# Patient Record
Sex: FEMALE | Race: WHITE | NOT HISPANIC OR LATINO | URBAN - METROPOLITAN AREA
[De-identification: names, ages, dates, MRNs, and addresses within clinical notes are randomized per-mention and may not be internally consistent; named-entity substitution may affect disease eponyms.]

---

## 2017-04-25 ENCOUNTER — COMMUNICATION - HEALTHEAST (OUTPATIENT)
Dept: FAMILY MEDICINE | Facility: CLINIC | Age: 52
End: 2017-04-25

## 2017-12-27 ENCOUNTER — OFFICE VISIT - HEALTHEAST (OUTPATIENT)
Dept: FAMILY MEDICINE | Facility: CLINIC | Age: 52
End: 2017-12-27

## 2017-12-27 DIAGNOSIS — J01.90 ACUTE SINUSITIS: ICD-10-CM

## 2018-01-11 ENCOUNTER — OFFICE VISIT - HEALTHEAST (OUTPATIENT)
Dept: FAMILY MEDICINE | Facility: CLINIC | Age: 53
End: 2018-01-11

## 2018-01-11 ENCOUNTER — RECORDS - HEALTHEAST (OUTPATIENT)
Dept: GENERAL RADIOLOGY | Facility: CLINIC | Age: 53
End: 2018-01-11

## 2018-01-11 DIAGNOSIS — R05.9 COUGH: ICD-10-CM

## 2018-01-11 DIAGNOSIS — Z12.11 COLON CANCER SCREENING: ICD-10-CM

## 2018-01-11 DIAGNOSIS — Z12.31 VISIT FOR SCREENING MAMMOGRAM: ICD-10-CM

## 2018-01-11 DIAGNOSIS — J31.0 RHINITIS: ICD-10-CM

## 2018-01-11 DIAGNOSIS — F33.9 MAJOR DEPRESSIVE DISORDER, RECURRENT EPISODE (H): ICD-10-CM

## 2018-01-11 DIAGNOSIS — E03.9 HYPOTHYROIDISM: ICD-10-CM

## 2018-01-11 RX ORDER — LEVOTHYROXINE SODIUM 200 UG/1
200 TABLET ORAL DAILY
Qty: 90 TABLET | Refills: 0 | Status: SHIPPED | OUTPATIENT
Start: 2018-01-11

## 2018-01-11 RX ORDER — ALBUTEROL SULFATE 90 UG/1
2 AEROSOL, METERED RESPIRATORY (INHALATION) EVERY 4 HOURS PRN
Qty: 1 INHALER | Refills: 0 | Status: SHIPPED | OUTPATIENT
Start: 2018-01-11

## 2018-02-20 ENCOUNTER — OFFICE VISIT - HEALTHEAST (OUTPATIENT)
Dept: FAMILY MEDICINE | Facility: CLINIC | Age: 53
End: 2018-02-20

## 2018-02-20 DIAGNOSIS — Z12.31 VISIT FOR SCREENING MAMMOGRAM: ICD-10-CM

## 2018-02-20 DIAGNOSIS — R21 RASH: ICD-10-CM

## 2018-02-20 DIAGNOSIS — F41.1 GENERALIZED ANXIETY DISORDER: ICD-10-CM

## 2018-02-20 DIAGNOSIS — F33.9 MAJOR DEPRESSIVE DISORDER, RECURRENT EPISODE (H): ICD-10-CM

## 2018-02-20 DIAGNOSIS — G43.909 MIGRAINE: ICD-10-CM

## 2018-02-20 DIAGNOSIS — Z13.1 DIABETES MELLITUS SCREENING: ICD-10-CM

## 2018-02-20 DIAGNOSIS — R05.9 COUGH: ICD-10-CM

## 2018-02-20 DIAGNOSIS — Z12.11 COLON CANCER SCREENING: ICD-10-CM

## 2018-02-20 DIAGNOSIS — E03.9 HYPOTHYROIDISM: ICD-10-CM

## 2018-02-20 DIAGNOSIS — R04.0 BLOODY NOSE: ICD-10-CM

## 2018-02-20 LAB
HBA1C MFR BLD: 5.8 % (ref 3.5–6.1)
T4 FREE SERPL-MCNC: 1.6 NG/DL (ref 0.7–1.8)
TSH SERPL DL<=0.005 MIU/L-ACNC: 0.06 UIU/ML (ref 0.3–5)

## 2018-02-20 RX ORDER — SUMATRIPTAN 25 MG/1
25 TABLET, FILM COATED ORAL
Qty: 10 TABLET | Refills: 6 | Status: SHIPPED | OUTPATIENT
Start: 2018-02-20

## 2018-02-20 RX ORDER — VENLAFAXINE HYDROCHLORIDE 37.5 MG/1
37.5 CAPSULE, EXTENDED RELEASE ORAL DAILY
Qty: 90 CAPSULE | Refills: 0 | Status: SHIPPED | OUTPATIENT
Start: 2018-02-20

## 2018-02-20 RX ORDER — IBUPROFEN 800 MG/1
800 TABLET, FILM COATED ORAL 3 TIMES DAILY PRN
Qty: 30 TABLET | Refills: 6 | Status: SHIPPED | OUTPATIENT
Start: 2018-02-20

## 2018-02-20 RX ORDER — CLOTRIMAZOLE AND BETAMETHASONE DIPROPIONATE 10; .64 MG/G; MG/G
CREAM TOPICAL
Qty: 30 G | Refills: 0 | Status: SHIPPED | OUTPATIENT
Start: 2018-02-20

## 2018-03-10 ENCOUNTER — RECORDS - HEALTHEAST (OUTPATIENT)
Dept: ADMINISTRATIVE | Facility: OTHER | Age: 53
End: 2018-03-10

## 2018-03-12 ENCOUNTER — OFFICE VISIT - HEALTHEAST (OUTPATIENT)
Dept: FAMILY MEDICINE | Facility: CLINIC | Age: 53
End: 2018-03-12

## 2018-03-12 DIAGNOSIS — R05.9 COUGH: ICD-10-CM

## 2018-04-03 ENCOUNTER — OFFICE VISIT - HEALTHEAST (OUTPATIENT)
Dept: FAMILY MEDICINE | Facility: CLINIC | Age: 53
End: 2018-04-03

## 2018-04-03 DIAGNOSIS — E03.9 HYPOTHYROIDISM: ICD-10-CM

## 2018-04-03 DIAGNOSIS — J32.9 SINUSITIS: ICD-10-CM

## 2018-04-03 DIAGNOSIS — Z78.0 MENOPAUSE: ICD-10-CM

## 2018-04-03 DIAGNOSIS — R73.03 PREDIABETES: ICD-10-CM

## 2018-04-03 DIAGNOSIS — L30.9 DERMATITIS: ICD-10-CM

## 2018-04-03 LAB
T4 FREE SERPL-MCNC: 1.4 NG/DL (ref 0.7–1.8)
TSH SERPL DL<=0.005 MIU/L-ACNC: 0.02 UIU/ML (ref 0.3–5)

## 2018-07-24 ENCOUNTER — COMMUNICATION - HEALTHEAST (OUTPATIENT)
Dept: ADMINISTRATIVE | Facility: CLINIC | Age: 53
End: 2018-07-24

## 2018-07-30 ENCOUNTER — COMMUNICATION - HEALTHEAST (OUTPATIENT)
Dept: FAMILY MEDICINE | Facility: CLINIC | Age: 53
End: 2018-07-30

## 2021-05-27 ENCOUNTER — RECORDS - HEALTHEAST (OUTPATIENT)
Dept: ADMINISTRATIVE | Facility: CLINIC | Age: 56
End: 2021-05-27

## 2021-05-31 VITALS — BODY MASS INDEX: 40.51 KG/M2 | WEIGHT: 251 LBS

## 2021-05-31 VITALS — BODY MASS INDEX: 40.19 KG/M2 | WEIGHT: 249 LBS

## 2021-06-01 VITALS — BODY MASS INDEX: 39.53 KG/M2 | WEIGHT: 244.9 LBS

## 2021-06-01 VITALS — BODY MASS INDEX: 40.35 KG/M2 | WEIGHT: 250 LBS

## 2021-06-01 VITALS — BODY MASS INDEX: 41.03 KG/M2 | WEIGHT: 254.19 LBS

## 2021-06-02 ENCOUNTER — RECORDS - HEALTHEAST (OUTPATIENT)
Dept: ADMINISTRATIVE | Facility: CLINIC | Age: 56
End: 2021-06-02

## 2021-06-15 NOTE — PROGRESS NOTES
Provider wore a mask during this visit.     Subjective:   Ailyn Hwang is a 52 y.o. female  Roomed by: Dulce S    Accompanied by Daughter    Refills needed? No    Do you have any forms that need to be filled out? No      Chief Complaint   Patient presents with     Sinus Problem     Poss sinus infection    Illness started last week of November with cough, sore throat, sinus congestion. Says cough has continued and sinus pressure is worse. Denies any CP, but admits some SOB. Denies fever, but admits chills in the last 2 days. Denies nausea, vomiting, or belly pain, but admits diarrhea. Admits headache, but not dizziness. Has been drinking and urinating normally. Appetite has been down. Admits fatigue.     Review of Systems  Const - Resp - see HPI  Allergies   Allergen Reactions     Amoxicillin Hives and Nausea Only     Codeine      Nyquil Swelling       Current Outpatient Prescriptions:      acetic acid-hydrocortisone (VOSOL-HC) otic solution, Administer 2 drops into ears 3 (three) times a day., Disp: 10 mL, Rfl: 1     albuterol (PROVENTIL HFA;VENTOLIN HFA) 90 mcg/actuation inhaler, Inhale 2 puffs every 4 (four) hours as needed., Disp: , Rfl:      aspirin 81 mg chewable tablet, , Disp: , Rfl:      buPROPion (WELLBUTRIN) 100 MG tablet, Take 1 tablet (100 mg total) by mouth 2 (two) times a day., Disp: 60 tablet, Rfl: 1     cetirizine (ZYRTEC) 10 MG tablet, Take 10 mg by mouth daily., Disp: , Rfl:      chromium 200 mcg Tab, Take by mouth., Disp: , Rfl:      docusate sodium (COLACE) 100 MG capsule, Take 100 mg by mouth daily., Disp: , Rfl:      fluticasone (FLONASE) 50 mcg/actuation nasal spray, 2 sprays into each nostril daily., Disp: 16 g, Rfl: 2     hydrochlorothiazide (HYDRODIURIL) 25 MG tablet, Take 25 mg by mouth daily., Disp: , Rfl:      ibuprofen (ADVIL,MOTRIN) 800 MG tablet, Take 1 tablet (800 mg total) by mouth 3 (three) times a day as needed., Disp: 30 tablet, Rfl: 0     levothyroxine (SYNTHROID) 200 MCG  tablet, Take 1 tablet (200 mcg total) by mouth daily. In addition to 88 mcg tablet daily, Disp: 30 tablet, Rfl: 0     MULTIVITAMIN (MULTIPLE VITAMIN ORAL), , Disp: , Rfl:      OMEGA-3S/DHA/EPA/FISH OIL (OMEGA 3 ORAL), Take by mouth., Disp: , Rfl:      omeprazole (PRILOSEC) 20 MG capsule, Take one capsule by mouth twice daily, Disp: 60 capsule, Rfl: 3     ondansetron (ZOFRAN) 4 MG tablet, 1 tablet., Disp: , Rfl:      SUMAtriptan (IMITREX) 25 MG tablet, Take 1 tablet (25 mg total) by mouth every 2 (two) hours as needed for migraine. Maximum of 8 tablets daily, Disp: 10 tablet, Rfl: 1     UNABLE TO FIND, Med Name:5HTP, Disp: , Rfl:      venlafaxine (EFFEXOR-XR) 37.5 MG 24 hr capsule, Take 1 capsule (37.5 mg total) by mouth daily., Disp: 90 capsule, Rfl: 0  Patient Active Problem List   Diagnosis     Fluid Overload     Insomnia     Obesity     Allergies     Attention-deficit Hyperactivity Disorder     Joint Pain, Localized In The Knee     Hypothyroidism     Major Depression, Recurrent     Generalized Anxiety Disorder     Esophageal Reflux     Headache     Obstructive Sleep Apnea     Bronchitis     Chronic Sinusitis     Prediabetes     Eczema     Vitamin D deficiency     H/O: hysterectomy     Medical History Reviewed  Objective:     Vitals:    12/27/17 1920   BP: 120/64   Patient Site: Right Arm   Patient Position: Sitting   Cuff Size: Adult Large   Pulse: 98   Resp: 18   Temp: 98.4  F (36.9  C)   TempSrc: Oral   SpO2: 99%   Weight: (!) 251 lb (113.9 kg)   Gen - Pt in NAD  Eyes - Conjunctiva non injected, no drainage  Face - mildly TTP over frontal sinus areas; mildly TTP over maxillary area  Ears - external canals - no induration, Right TM - not injected, Left TM - not injected   Nose - mildly congested, no nasal drainage  Pharynx - non injected, tonsils 1+ size  Neck - supple, no cervical adenopathy, no masses  Cor - RRR w/o murmur  Lungs - Good air entry; no wheezes or crackles noted on auscultation - sporadic  coughing noted  Skin - no lesions, no rashes noted    Assessment - Plan   Medical Decision Making -52-year-old woman presents with almost a month of URI symptoms with nasal congestion sinus pressure and cough.  Because of the duration of patient's symptoms will treat this as an acute sinusitis.     1. Acute sinusitis  - doxycycline (ADOXA) 100 MG tablet; Take 1 tablet (100 mg total) by mouth 2 (two) times a day for 7 days.  Dispense: 14 tablet; Refill: 0    At the conclusion of the encounter, assessment and plan were discussed.   All questions were answered.   The patient or guardian acknowledged understanding and was involved in the decision making regarding the overall care plan.    Patient Instructions     1. Keep well hydrated  2.  May alternate Tylenol every 6 hours with ibuprofen every 6 hours as needed for fever or pain  3. If symptoms not improved after completing antibiotics, follow up with primary  4. If you have any questions, call the clinic number - it's answered 24/7      Acute Bacterial Rhinosinusitis (ABRS)  Acute bacterial rhinosinusitis (ABRS) is an infection of your nasal cavity and sinuses. It s caused by bacteria. Acute means that you ve had symptoms for less than 12 weeks.  Understanding your sinuses  The nasal cavity is the large air-filled space behind your nose. The sinuses are a group of spaces formed by the bones of your face. They connect with your nasal cavity. ABRS causes the tissue lining these spaces to become inflamed. Mucus may not drain normally. This leads to facial pain and other symptoms.  What causes ABRS?  ABRS most often follows an upper respiratory infection caused by a virus. Bacteria then infect the lining of your nasal cavity and sinuses. But you can also get ABRS if you have:    Nasal allergies    Long-term nasal swelling and congestion not caused by allergies    Blockage in the nose  Symptoms of ABRS  The symptoms of ABRS may be different for each person, and can  include:    Nasal congestion    Runny nose    Fluid draining from the nose down the throat (postnasal drip)    Headache    Cough    Pain in the sinuses    Thick, colored fluid from the nose (mucus)    Fever  Diagnosing ABRS  ABRS may be diagnosed if you ve had an upper respiratory infection like a cold and cough for longer than 10 to 14 days. Your health care provider will ask about your symptoms and your medical history. The provider will check your vital signs, including your temperature. You ll have a physical exam. The health care provider will check your ears, nose, and throat. You likely won t need any tests. If ABRS comes back, you may have a culture or other tests.  Treatment for ABRS  Treatment may include:    Antibiotic medicine. This is for symptoms that last for at least 10 to 14 days.    Nasal corticosteroid medicine. Drops or spray used in the nose can lessen swelling and congestion.    Over-the-counter pain medicine. This is to lessen sinus pain and pressure.    Nasal decongestant medicine. Spray or drops may help to lessen congestion. Do not use them for more than a few days.    Salt wash (saline irrigation). This can help to loosen mucus.  Possible complications of ABRS  ABRS may come back or become long-term (chronic).  In rare cases, ABRS may cause complications such as:     Inflamed tissue around the brain and spinal cord (meningitis)    Inflamed tissue around the eyes (orbital cellulitis)    Inflamed bones around the sinuses (osteitis)  These problems may need to be treated in a hospital with intravenous (IV) antibiotic medicine or surgery.  When to call the health care provider  Call your health care provider if you have any of the following:    Symptoms that don t get better, or get worse    Symptoms that don t get better after 3 to 5 days on antibiotics    Trouble seeing    Swelling around your eyes    Confusion or trouble staying awake   Date Last Reviewed: 3/3/2015    [id number] [facility]  [employer]. [address] [place] [address], [place] [address]. All rights reserved. This information is not intended as a substitute for professional medical care. Always follow your healthcare professional's instructions.

## 2021-06-15 NOTE — PROGRESS NOTES
ASSESSMENT/PLAN:    Cough  Pleasant 52 y.o.  female presents with cough following completion of 2 rounds of antibiotics (azithromycin & doxycycline).  CXR today was unremarkable.  I suspect she may have post-infectious cough.  I will give her tessalon perles & prednisone.  Will also refill albuterol.  The patient may continue with OTC symptomatic treatment.  Rest, hydration, nutritional support, and time were counseled.  Follow up if the patient is not better in 1-2 weeks.  The patient verbalized understanding and agreed with the plan.      -     XR Chest 2 Views; Future; Expected date: 1/11/18  -     albuterol (PROAIR HFA;PROVENTIL HFA;VENTOLIN HFA) 90 mcg/actuation inhaler; Inhale 2 puffs every 4 (four) hours as needed.  Dispense: 1 Inhaler; Refill: 0  -     benzonatate (TESSALON PERLES) 100 MG capsule; Take 1 capsule (100 mg total) by mouth every 6 (six) hours as needed for cough.  Dispense: 30 capsule; Refill: 0  -     predniSONE (DELTASONE) 20 MG tablet; 60mg x 3 d, 40mg x 3d, 20mg x 3d  Dispense: 18 tablet; Refill: 0    Colon cancer screening  -     Ambulatory referral for Colonoscopy    Visit for screening mammogram  -     Mammo Screening Bilateral; Future; Expected date: 1/11/18      Orders Placed This Encounter   Procedures     XR Chest 2 Views     Standing Status:   Future     Number of Occurrences:   1     Standing Expiration Date:   1/11/2019     Order Specific Question:   Is the patient pregnant?     Answer:   No     Order Specific Question:   Can the procedure be changed per Radiologist protocol?     Answer:   Yes     Mammo Screening Bilateral     Standing Status:   Future     Standing Expiration Date:   4/12/2019     Order Specific Question:   Is the patient pregnant?     Answer:   No     Order Specific Question:   Can the procedure be changed per Radiologist protocol?     Answer:   Yes     Ambulatory referral for Colonoscopy     Referral Priority:   Routine     Referral Type:   Colonoscopy      Requested Specialty:   Gastroenterology     Number of Visits Requested:   1           CHIEF COMPLAINT:  Chief Complaint   Patient presents with     Rash     all over body and bidy itchy     Cough     x months        HISTORY OF PRESENT ILLNESS:  Ailyn is a 52 y.o. female presenting to the clinic today for a rash. Here with her  today. She started to have a rash a couple of days ago. It is located on the chest and arms. She feels very itchy. Her last dose of doxycycline for sinusitis was 4 days ago.     Cough: she has had a cough for the past 3 months. She first had z-scotty for her symptoms, and felt no improvement. She then went to walk in clinic on 12/27/2017, and was given doxycycline for probable sinusitis. Her sinuses feel improved. She continues to have a deep cough. The cough can be dry to productive. She has been using the albuterol for shortness of breath and wheezing. She has felt run down with chills and feeling feverish. She has not had a chest XR in the past 3 months.    Health Maintenance: She is due for a mammogram and colonoscopy.      REVIEW OF SYSTEMS:   Eyes: Negative.   Cardiovascular: Negative.   Gastrointestinal: Negative.   Endocrine: Negative.   Genitourinary: Negative.   Musculoskeletal: Negative.   Allergic/Immunologic: Negative.   Neurological: Negative.   Hematological: Negative.   Psychiatric/Behavioral: Negative.   All other systems are negative.    PFSH:  No new history.     TOBACCO USE:  History   Smoking Status     Former Smoker     Quit date: 2/11/2002   Smokeless Tobacco     Never Used       VITALS:  Vitals:    01/11/18 1156   BP: 136/88   Pulse: 76   Temp: 98.3  F (36.8  C)   TempSrc: Oral   SpO2: 97%   Weight: (!) 249 lb (112.9 kg)     Wt Readings from Last 3 Encounters:   01/11/18 (!) 249 lb (112.9 kg)   12/27/17 (!) 251 lb (113.9 kg)   02/11/16 (!) 251 lb 11.2 oz (114.2 kg)       PHYSICAL EXAM:  Constitutional: Patient is oriented to person, place, and time. Patient appears  well-developed and well-nourished. No distress.   Head: Normocephalic and atraumatic.   Right Ear: External ear normal. Ear canal and TM normal.   Left Ear: External ear normal. Ear canal and TM normal.   Nose: Nose normal.   Mouth/Throat: Oropharynx is clear and moist. No oropharyngeal exudate.   Eyes: Conjunctivae and EOM are normal. Pupils are equal, round, and reactive to light. Right eye exhibits no discharge. Left eye exhibits no discharge. No scleral icterus.   Cardiovascular: Normal rate, regular rhythm, normal heart sounds and intact distal pulses. No murmur heard.   Pulmonary/Chest: Effort normal and breath sounds normal. No stridor. No respiratory distress. Patient has no wheezes, no rales, exhibits no tenderness.     Results for orders placed or performed in visit on 02/11/16   Thyroid Stimulating Hormone (TSH)   Result Value Ref Range    TSH 5.90 (H) 0.30 - 5.00 uIU/mL   Lipid Cascade   Result Value Ref Range    Cholesterol 245 (H) <=199 mg/dL    Triglycerides 150 (H) <=149 mg/dL    HDL Cholesterol 44 (L) >=50 mg/dL    LDL Calculated 171 (H) <=129 mg/dL    Patient Fasting > 8hrs? Yes    Glucose   Result Value Ref Range    Glucose 89 70 - 99 mg/dL    Patient Fasting > 8hrs? Yes            ADDITIONAL HISTORY SUMMARIZED (2): Reviewed note from [facility] regarding sinusitis and cough.  DECISION TO OBTAIN EXTRA INFORMATION (1): None.   RADIOLOGY TESTS (1): Ordered chest XR.  LABS (1): None.  MEDICINE TESTS (1): None.  INDEPENDENT REVIEW (2 each): None.     I, Lalita Strauss, am scribing for and in the presence of, Dr. Hudson.    IGera MD , personally performed the services described in this documentation, as scribed by Lalita Strauss in my presence, and it is both accurate and complete.    MEDICATIONS:  Current Outpatient Prescriptions   Medication Sig Dispense Refill     acetic acid-hydrocortisone (VOSOL-HC) otic solution Administer 2 drops into ears 3 (three) times a  day. 10 mL 1     albuterol (PROAIR HFA;PROVENTIL HFA;VENTOLIN HFA) 90 mcg/actuation inhaler Inhale 2 puffs every 4 (four) hours as needed. 1 Inhaler 0     aspirin 81 mg chewable tablet        cetirizine (ZYRTEC) 10 MG tablet Take 10 mg by mouth daily.       fluticasone (FLONASE) 50 mcg/actuation nasal spray 2 sprays into each nostril daily. 16 g 2     hydrochlorothiazide (HYDRODIURIL) 25 MG tablet Take 25 mg by mouth daily.       levothyroxine (SYNTHROID) 200 MCG tablet Take 1 tablet (200 mcg total) by mouth daily. In addition to 88 mcg tablet daily 30 tablet 0     omeprazole (PRILOSEC) 20 MG capsule Take one capsule by mouth twice daily 60 capsule 3     ondansetron (ZOFRAN) 4 MG tablet 1 tablet.       SUMAtriptan (IMITREX) 25 MG tablet Take 1 tablet (25 mg total) by mouth every 2 (two) hours as needed for migraine. Maximum of 8 tablets daily 10 tablet 1     UNABLE TO FIND Med Name:5HTP       venlafaxine (EFFEXOR-XR) 37.5 MG 24 hr capsule Take 1 capsule (37.5 mg total) by mouth daily. 90 capsule 0     benzonatate (TESSALON PERLES) 100 MG capsule Take 1 capsule (100 mg total) by mouth every 6 (six) hours as needed for cough. 30 capsule 0     buPROPion (WELLBUTRIN) 100 MG tablet Take 1 tablet (100 mg total) by mouth 2 (two) times a day. 60 tablet 1     chromium 200 mcg Tab Take by mouth.       docusate sodium (COLACE) 100 MG capsule Take 100 mg by mouth daily.       ibuprofen (ADVIL,MOTRIN) 800 MG tablet Take 1 tablet (800 mg total) by mouth 3 (three) times a day as needed. 30 tablet 0     MULTIVITAMIN (MULTIPLE VITAMIN ORAL)        OMEGA-3S/DHA/EPA/FISH OIL (OMEGA 3 ORAL) Take by mouth.       predniSONE (DELTASONE) 20 MG tablet 60mg x 3 d, 40mg x 3d, 20mg x 3d 18 tablet 0     No current facility-administered medications for this visit.        Total data points: 3

## 2021-06-16 PROBLEM — G43.909 MIGRAINE: Status: ACTIVE | Noted: 2018-02-20

## 2021-06-16 NOTE — PROGRESS NOTES
ASSESSMENT/PLAN:  Rash  Suspect contact dermatitis  Will give her lotrisone  If not better, consider dermatology  -     JIC LAV  -     clotrimazole-betamethasone (LOTRISONE) cream; Apply to affected skin three times daily  Dispense: 30 g; Refill: 0    Hypothyroidism  Levothyroxine 200mcg  -     Thyroid Stimulating Hormone (TSH)  -     T4, Free    Migraine  refilled  -     ibuprofen (ADVIL,MOTRIN) 800 MG tablet; Take 1 tablet (800 mg total) by mouth 3 (three) times a day as needed.  Dispense: 30 tablet; Refill: 6  -     SUMAtriptan (IMITREX) 25 MG tablet; Take 1 tablet (25 mg total) by mouth every 2 (two) hours as needed for migraine. Maximum of 8 tablets daily  Dispense: 10 tablet; Refill: 6    Bloody nose  Stop ASA    Generalized anxiety disorder, depression  refilled  -     venlafaxine (EFFEXOR-XR) 37.5 MG 24 hr capsule; Take 1 capsule (37.5 mg total) by mouth daily.  Dispense: 90 capsule; Refill: 0    Colon cancer screening  -     Ambulatory referral for Colonoscopy    Visit for screening mammogram  -     Mammo Screening Bilateral; Future; Expected date: 2/20/18    Cough  Will refer to allergy for f/o asthma  ACT=20  -     Ambulatory referral to Allergy    Diabetes mellitus screening  -     Glycosylated Hemoglobin A1c      Orders Placed This Encounter   Procedures     Mammo Screening Bilateral     Standing Status:   Future     Standing Expiration Date:   5/22/2019     Order Specific Question:   Is the patient pregnant?     Answer:   No     Order Specific Question:   Can the procedure be changed per Radiologist protocol?     Answer:   Yes     Thyroid Stimulating Hormone (TSH)     T4, Free     JIC LAV     Glycosylated Hemoglobin A1c     Ambulatory referral for Colonoscopy     Referral Priority:   Routine     Referral Type:   Colonoscopy     Requested Specialty:   Gastroenterology     Number of Visits Requested:   1     Ambulatory referral to Allergy     Referral Priority:   Routine     Referral Type:   Allergy,  Asthma, and Immunology     Referral Reason:   Evaluation and Treatment     Requested Specialty:   Allergy     Number of Visits Requested:   1           CHIEF COMPLAINT:  Chief Complaint   Patient presents with     Rash     mostly both legs, itches all over     med check     discuss med dose for  Effexor     Medication Refill       HISTORY OF PRESENT ILLNESS:  Ailyn is a 52 y.o. female presenting to the clinic today for a rash. Here with her  today. She has had a rash on her lower legs, back and chest for the past month or so. It is very itchy for her. No new lotions, soaps, detergents, medications. No recent travel. The rash is most pronounced on her left lower leg. No one at home has similar symptoms. She has been oil, and a topical that she does not remember the name of. She has tried some OTC topical for eczema and oatmeal baths, and this has not helped the itchiness. Denies sore throat, fevers, chills. She has been keeping the areas of rash moisturized. She has had appearance of more spots since she first noticed them.     Cough: She had onset of cough at the end of November 2017. She still has her barky cough, but the frequency of cough is much less. She will use albuterol for her couhg when she is feeling short of breath. There has been times when the cough feels better, and times when she feels like it is worse. She says that she has not felt her baseline since first onset of her cough and cold months ago.     Depression/Anxiety: She is currently taking venlafaxine 37.5mg daily. This was a recent switch from Wellbutrin. She feels like this is helping her somewhat. She feels like it is helping more with some of her menopause symptoms.     Hypothyroidism: She is currently taking levothyroxine 200mcg. She takes this daily, and has been consistent with this. Due for a thyroid check today. She does note that she has had some chest palpitations.     Epistaxis: She has had 3 bloody noses in the past few weeks.  These have last about 30 minutes for her. She is taking a daily baby aspirin.     Health Maintenance: She is due for a colonoscopy. She is due for a mammogram.     REVIEW OF SYSTEMS:   Constitutional: Negative.   HENT: Negative.   Eyes: Negative.   Respiratory: Negative.   Cardiovascular: Negative.   Gastrointestinal: Negative.   Endocrine: Negative.   Genitourinary: Negative.   Musculoskeletal: Negative.   Allergic/Immunologic: Negative.   Neurological: Negative.   Hematological: Negative.   Psychiatric/Behavioral: Negative.   All other systems are negative.    PFSH:  No new history.     TOBACCO USE:  History   Smoking Status     Former Smoker     Quit date: 2/11/2002   Smokeless Tobacco     Never Used       VITALS:  Vitals:    02/20/18 1450   BP: 108/66   Pulse: 68   Weight: (!) 250 lb (113.4 kg)     Wt Readings from Last 3 Encounters:   02/20/18 (!) 250 lb (113.4 kg)   01/11/18 (!) 249 lb (112.9 kg)   12/27/17 (!) 251 lb (113.9 kg)       PHYSICAL EXAM:  Constitutional: Patient is oriented to person, place, and time. Patient appears well-developed and well-nourished. No distress.   Head: Normocephalic and atraumatic.   Right Ear: External ear normal.   Left Ear: External ear normal.   Nose: Nose normal.   Eyes: Conjunctivae and EOM are normal. Right eye exhibits no discharge. Left eye exhibits no discharge. No scleral icterus.   Skin: Skin is warm and dry. Non specific irritation of both leg; left leg is worse than right.     Results for orders placed or performed in visit on 02/20/18   Glycosylated Hemoglobin A1c   Result Value Ref Range    Hemoglobin A1c 5.8 3.5 - 6.1 %           ADDITIONAL HISTORY SUMMARIZED (2): None.  DECISION TO OBTAIN EXTRA INFORMATION (1): None.   RADIOLOGY TESTS (1): Ordered mammogram today.  LABS (1): Ordered labs today.  MEDICINE TESTS (1): None.  INDEPENDENT REVIEW (2 each): None.     Lalita ALBERTS, am scribing for and in the presence of, Dr. Hudson.    Gera ALBERTS   Becca Norris MD , personally performed the services described in this documentation, as scribed by Lalita Strauss in my presence, and it is both accurate and complete.    MEDICATIONS:  Current Outpatient Prescriptions   Medication Sig Dispense Refill     albuterol (PROAIR HFA;PROVENTIL HFA;VENTOLIN HFA) 90 mcg/actuation inhaler Inhale 2 puffs every 4 (four) hours as needed. 1 Inhaler 0     cetirizine (ZYRTEC) 10 MG tablet Take 10 mg by mouth daily.       docusate sodium (COLACE) 100 MG capsule Take 100 mg by mouth daily.       fluticasone (FLONASE) 50 mcg/actuation nasal spray 2 sprays into each nostril daily. 16 g 2     hydrochlorothiazide (HYDRODIURIL) 25 MG tablet Take 25 mg by mouth daily as needed. For leg swelling       ibuprofen (ADVIL,MOTRIN) 800 MG tablet Take 1 tablet (800 mg total) by mouth 3 (three) times a day as needed. 30 tablet 6     levothyroxine (SYNTHROID) 200 MCG tablet Take 1 tablet (200 mcg total) by mouth daily. In addition to 88 mcg tablet daily 90 tablet 0     MULTIVITAMIN (MULTIPLE VITAMIN ORAL)        OMEGA-3S/DHA/EPA/FISH OIL (OMEGA 3 ORAL) Take by mouth.       omeprazole (PRILOSEC) 20 MG capsule Take one capsule by mouth twice daily 60 capsule 3     SUMAtriptan (IMITREX) 25 MG tablet Take 1 tablet (25 mg total) by mouth every 2 (two) hours as needed for migraine. Maximum of 8 tablets daily 10 tablet 6     venlafaxine (EFFEXOR-XR) 37.5 MG 24 hr capsule Take 1 capsule (37.5 mg total) by mouth daily. 90 capsule 0     clotrimazole-betamethasone (LOTRISONE) cream Apply to affected skin three times daily 30 g 0     UNABLE TO FIND Med Name:5HTP       No current facility-administered medications for this visit.        Total data points: 2

## 2021-06-16 NOTE — PROGRESS NOTES
Assessment/Plan:        Diagnoses and all orders for this visit:    Cough        Current episode likely viral.  Discussed condition in general as well as holding off on antibiotics.  We also discussed about her other medications, albuterol.  She then got upset and prefers to follow-up with PCP, Dr. Hudson.  Discussed concerns with antibiotic use when not indicated.  She left the room even before the conversation was done.  To visit with provider of choice if persistent or worse in the next couple of days as Dr. Hudson unavailable until 3/26/18.   Subjective:    Patient ID: Ailyn Hwang is a 52 y.o. female.    HPI    Ailyn is here with concerns about cough.  Was not feeling well 5 days ago.  3 days ago, started having colds, cough, fever with T-max of 101.7.  Notes sinus pressure, body aches, headache.  She has chest congestion and unable to mobilize secretion.  No hemoptysis.  She was not exposed to anyone ill that she is aware of.  No recent travel.  Quit smoking 25 years ago, less than a pack per day for around 10 years.  Tried TheraFlu, ibuprofen.  She does not tolerate Mucinex, upset stomach.  She has albuterol as well.  She has had sinus infections, cough in November.  Was referred to allergy last month.  Completed 2 courses of antibiotics, Z-Bertram, doxycycline.  Chest x-ray January 11, 2018 was negative.    Review of Systems  As above otherwise negative.          Objective:    Physical Exam  /60 (Patient Site: Left Arm, Patient Position: Sitting, Cuff Size: Adult Large)  Pulse 85  Temp 98.6  F (37  C) (Oral)   Wt (!) 244 lb 14.4 oz (111.1 kg)  SpO2 95%  Breastfeeding? No  BMI 39.53 kg/m2    Vital signs noted above. AAO ×3.  HEENT no nasal discharge, moist oral mucosa. Ears:TMs clear and intact, no fluid collection.  Very minimal erythema surrounding the border of the right TM.  Neck: Supple neck, nonpalpable cervical lymph nodes.  Lungs: Clear to auscultation bilateral.  Heart: S1-S2 regular rate and  rhythm, no murmurs were noted.  Abdomen:  with bowel sounds.  Extremities: pulses were full and equal.

## 2021-06-17 NOTE — PROGRESS NOTES
ASSESSMENT/PLAN:  Itchy rash, generalized and intermittent  Patient thinks it may be due to effexor, given the timing of the rash with starting medication  Stop effexor  Benadryl for the itch and rash  If still symptomatic 2 wks after stopping effexor, then recommend dermatology    Menopause symptoms (hot flash, night sweat, irritability, concentration) in the setting of Hypothyroidism  Taking levothyroxine 200mcg  TSH was slightly low last time.  Will check levels and adjust dose  Will also [facility] to [facility]  -     Thyroid Stimulating Hormone (TSH)  -     T4, Free    Sinusitis  -     azithromycin (ZITHROMAX Z-JAYRO) 250 MG tablet; Take 2 tablets (500 mg) on  Day 1,  followed by 1 tablet (250 mg) once daily on Days 2 through 5.  Dispense: 6 tablet; Refill: 0    Prediabetes  Watch out for dietary intake of carbohydrate  Counseled lifestyle modifications      Orders Placed This Encounter   Procedures     Thyroid Stimulating Hormone (TSH)     T4, Free     JIC LAV       CHIEF COMPLAINT:  Chief Complaint   Patient presents with     hives     facial pressure     Paperwork     Ear Pain     left ear     Anxiety       HISTORY OF PRESENT ILLNESS:  Ailyn is a 52 y.o. female presenting to the clinic today for a follow up a rash. Here with her  today. She has a rash that she believes are hives for the past 2 months. They are very itchy for her. She feels like it is related to the venlafaxine which is a new medication for her. She has not taken this in the past 2 days. She has been taking Benadryl for the rash. The rash and itchiness come and go for her. The rash is diffusely over her body now. Since stopping the venlafaxine, she is feeling foggy in the head, and feels her mood is more related to hormone fluctuations in menopause. She has felt more on edge and irritable, and continues to experience hot flashes, night sweats, difficulties concentrating, and sleeplessness.     Little interest or pleasure in doing  things: Not at all  Feeling down, depressed, or hopeless: Not at all  Trouble falling or staying asleep, or sleeping too much: Not at all  Feeling tired or having little energy: Not at all  Poor appetite or overeating: Not at all  Feeling bad about yourself - or that you are a failure or have let yourself or your family down: Not at all  Trouble concentrating on things, such as reading the newspaper or watching television: Several days  Moving or speaking so slowly that other people could have noticed. Or the opposite - being so fidgety or restless that you have been moving around a lot more than usual: Not at all  Thoughts that you would be better off dead, or of hurting yourself in some way: Not at all  PHQ-9 Total Score: 1  If you checked off any problems, how difficult have these problems made it for you to do your work, take care of things at home, or get along with other people?: Not difficult at all    Feeling nervous, anxious or on edge: 0  Not being able to stop or control worry: 0  Worrying too much about different things: 0  Trouble relaxin  Being so restless that is is hard to sit still: 1  Becoming easily annnoyed or irritable: 1  Feeling afraid as if something awful might happen: 0  JOHNNA-7 Total: 2  How difficult did these problems make it for you to do your work, take care of things at home or get along with other people? : Not difficult at all    Ear Pain: She has had ear pain and facial pressure for the past few weeks. She has discolored nasal discharged. She feels pain in the left ear, and some pressure in the right ear. She still has her cough. She has mild sore throat. Denies fevers or chills. She did see Dr. Petersen on 3/12/2018, and go to an urgent care, and was given a 7 day course of antibiotics by urgent care. She did take and finish the antibiotic, and had some improvement of her symptoms.     Additionally, she comes with paperwork to be completed.     REVIEW OF SYSTEMS:   Constitutional:  Negative.   Eyes: Negative.    Cardiovascular: Negative.   Gastrointestinal: Negative.   Endocrine: Negative.   Genitourinary: Negative.   Musculoskeletal: Negative.   Allergic/Immunologic: Negative.   Neurological: Negative.   Hematological: Negative.   Psychiatric/Behavioral: Negative.   All other systems are negative.    PFSH:  No new history.     TOBACCO USE:  History   Smoking Status     Former Smoker     Quit date: 2/11/2002   Smokeless Tobacco     Never Used       VITALS:  Vitals:    04/03/18 0750   BP: 118/60   Pulse: 80   Weight: (!) 254 lb 3 oz (115.3 kg)     Wt Readings from Last 3 Encounters:   04/03/18 (!) 254 lb 3 oz (115.3 kg)   03/12/18 (!) 244 lb 14.4 oz (111.1 kg)   02/20/18 (!) 250 lb (113.4 kg)       PHYSICAL EXAM:  Constitutional: Patient is oriented to person, place, and time. Patient appears well-developed and well-nourished. No distress.   Head: Normocephalic and atraumatic.   Right Ear: External ear normal. Ear canal and TM normal.   Left Ear: External ear normal. Ear canal and TM normal.   Nose: Nose normal.   Mouth/Throat: Oropharynx is clear and moist. No oropharyngeal exudate.   Eyes: Conjunctivae and EOM are normal. Pupils are equal, round, and reactive to light. Right eye exhibits no discharge. Left eye exhibits no discharge. No scleral icterus.   Cardiovascular: Normal rate, regular rhythm, normal heart sounds and intact distal pulses. No murmur heard.   Pulmonary/Chest: Effort normal and breath sounds normal. No stridor. No respiratory distress. Patient has no wheezes, no rales, exhibits no tenderness.    Skin: Skin is warm and dry. No rash noted. Patient is not diaphoretic. No erythema. No pallor.    Results for orders placed or performed in visit on 02/20/18   Thyroid Stimulating Hormone (TSH)   Result Value Ref Range    TSH 0.06 (L) 0.30 - 5.00 uIU/mL   T4, Free   Result Value Ref Range    Free T4 1.6 0.7 - 1.8 ng/dL   Glycosylated Hemoglobin A1c   Result Value Ref Range     Hemoglobin A1c 5.8 3.5 - 6.1 %           ADDITIONAL HISTORY SUMMARIZED (2): Reviewed and summarized note from Dr. Petersen from 3/12/2018 regarding cough.  DECISION TO OBTAIN EXTRA INFORMATION (1): None.   RADIOLOGY TESTS (1): None.  LABS (1): Reviewed and ordered labs today.  MEDICINE TESTS (1): None.  INDEPENDENT REVIEW (2 each): None.     I, Lalita Strauss, am scribing for and in the presence of, Dr. Hudson.    IGera MD , personally performed the services described in this documentation, as scribed by Lalita Strauss in my presence, and it is both accurate and complete.    MEDICATIONS:  Current Outpatient Prescriptions   Medication Sig Dispense Refill     albuterol (PROAIR HFA;PROVENTIL HFA;VENTOLIN HFA) 90 mcg/actuation inhaler Inhale 2 puffs every 4 (four) hours as needed. 1 Inhaler 0     cetirizine (ZYRTEC) 10 MG tablet Take 10 mg by mouth daily.       clotrimazole-betamethasone (LOTRISONE) cream Apply to affected skin three times daily 30 g 0     docusate sodium (COLACE) 100 MG capsule Take 100 mg by mouth daily.       fluticasone (FLONASE) 50 mcg/actuation nasal spray 2 sprays into each nostril daily. 16 g 2     hydrochlorothiazide (HYDRODIURIL) 25 MG tablet Take 25 mg by mouth daily as needed. For leg swelling       ibuprofen (ADVIL,MOTRIN) 800 MG tablet Take 1 tablet (800 mg total) by mouth 3 (three) times a day as needed. 30 tablet 6     levothyroxine (SYNTHROID) 200 MCG tablet Take 1 tablet (200 mcg total) by mouth daily. In addition to 88 mcg tablet daily 90 tablet 0     MULTIVITAMIN (MULTIPLE VITAMIN ORAL)        omeprazole (PRILOSEC) 20 MG capsule Take one capsule by mouth twice daily 60 capsule 3     SUMAtriptan (IMITREX) 25 MG tablet Take 1 tablet (25 mg total) by mouth every 2 (two) hours as needed for migraine. Maximum of 8 tablets daily 10 tablet 6     UNABLE TO FIND Med Name:5HTP       venlafaxine (EFFEXOR-XR) 37.5 MG 24 hr capsule Take 1 capsule (37.5 mg  total) by mouth daily. 90 capsule 0     azithromycin (ZITHROMAX Z-JAYRO) 250 MG tablet Take 2 tablets (500 mg) on  Day 1,  followed by 1 tablet (250 mg) once daily on Days 2 through 5. 6 tablet 0     OMEGA-3S/DHA/EPA/FISH OIL (OMEGA 3 ORAL) Take by mouth.       No current facility-administered medications for this visit.        Total data points: 3

## 2021-07-03 ENCOUNTER — HEALTH MAINTENANCE LETTER (OUTPATIENT)
Age: 56
End: 2021-07-03

## 2021-07-03 NOTE — ADDENDUM NOTE
Addendum Note by Gera Milner MD at 1/11/2018 12:55 PM     Author: Gera Milner MD Service: -- Author Type: Physician    Filed: 1/11/2018 12:55 PM Encounter Date: 1/11/2018 Status: Signed    : Gera Milner MD (Physician)    Addended by: GERA MILNER on: 1/11/2018 12:55 PM        Modules accepted: Orders

## 2021-10-23 ENCOUNTER — HEALTH MAINTENANCE LETTER (OUTPATIENT)
Age: 56
End: 2021-10-23

## 2022-07-30 ENCOUNTER — HEALTH MAINTENANCE LETTER (OUTPATIENT)
Age: 57
End: 2022-07-30

## 2022-10-10 ENCOUNTER — HEALTH MAINTENANCE LETTER (OUTPATIENT)
Age: 57
End: 2022-10-10

## 2023-08-19 ENCOUNTER — HEALTH MAINTENANCE LETTER (OUTPATIENT)
Age: 58
End: 2023-08-19